# Patient Record
Sex: MALE | Race: WHITE | Employment: FULL TIME | URBAN - METROPOLITAN AREA
[De-identification: names, ages, dates, MRNs, and addresses within clinical notes are randomized per-mention and may not be internally consistent; named-entity substitution may affect disease eponyms.]

---

## 2023-02-14 ENCOUNTER — OFFICE VISIT (OUTPATIENT)
Dept: URGENT CARE | Age: 63
End: 2023-02-14
Payer: COMMERCIAL

## 2023-02-14 VITALS
TEMPERATURE: 98.4 F | DIASTOLIC BLOOD PRESSURE: 86 MMHG | OXYGEN SATURATION: 98 % | SYSTOLIC BLOOD PRESSURE: 148 MMHG | WEIGHT: 247.5 LBS | RESPIRATION RATE: 18 BRPM | HEART RATE: 107 BPM

## 2023-02-14 DIAGNOSIS — L30.9 DERMATITIS: ICD-10-CM

## 2023-02-14 DIAGNOSIS — R94.31 ABNORMAL ECG: ICD-10-CM

## 2023-02-14 DIAGNOSIS — R05.9 COUGH, UNSPECIFIED TYPE: Primary | ICD-10-CM

## 2023-02-14 LAB — SARS-COV-2 PCR, POC: NEGATIVE

## 2023-02-14 PROCEDURE — 87635 SARS-COV-2 COVID-19 AMP PRB: CPT | Performed by: NURSE PRACTITIONER

## 2023-02-14 PROCEDURE — 99204 OFFICE O/P NEW MOD 45 MIN: CPT | Performed by: NURSE PRACTITIONER

## 2023-02-14 PROCEDURE — 93000 ELECTROCARDIOGRAM COMPLETE: CPT | Performed by: NURSE PRACTITIONER

## 2023-02-14 RX ORDER — PREDNISONE 10 MG/1
10 TABLET ORAL SEE ADMIN INSTRUCTIONS
Qty: 21 TABLET | Refills: 0 | Status: SHIPPED | OUTPATIENT
Start: 2023-02-14

## 2023-02-14 RX ORDER — DOXYCYCLINE 100 MG/1
100 CAPSULE ORAL 2 TIMES DAILY
Qty: 20 CAPSULE | Refills: 0 | Status: SHIPPED | OUTPATIENT
Start: 2023-02-14 | End: 2023-02-24

## 2023-02-14 NOTE — PROGRESS NOTES
HPI   Pt presents with complaints cough and chest congestion which has moved down to his abdomen. Abdomen feels \"tight\". Has pruritic rash to legs, back, abdomen. Swelling to left foot and ankle. Denies fevers, CP, SOB, N/V/D. Has not had full CPE since [facility] release in 2009. Used to take BP and DM meds but stopped when he was released and has resumed healthy lifestyle with diet and remaining active. Nonsmoker. Drinks few beers every night. Denies illicit drug use. Works construction. Past Medical History:   Diagnosis Date    Hypertension         History reviewed. No pertinent surgical history. History reviewed. No pertinent family history. Social History     Socioeconomic History    Marital status: SINGLE     Spouse name: Not on file    Number of children: Not on file    Years of education: Not on file    Highest education level: Not on file   Occupational History    Not on file   Tobacco Use    Smoking status: Never    Smokeless tobacco: Never   Vaping Use    Vaping Use: Never used   Substance and Sexual Activity    Alcohol use: Yes     Alcohol/week: 6.0 standard drinks     Types: 6 Cans of beer per week     Comment: daily    Drug use: Never    Sexual activity: Yes   Other Topics Concern    Not on file   Social History Narrative    Not on file     Social Determinants of Health     Financial Resource Strain: Not on file   Food Insecurity: Not on file   Transportation Needs: Not on file   Physical Activity: Not on file   Stress: Not on file   Social Connections: Not on file   Intimate Partner Violence: Not on file   Housing Stability: Not on file                ALLERGIES: Patient has no known allergies. Review of Systems   Constitutional:  Negative for chills, fatigue and fever. HENT:  Negative for congestion and sore throat. Respiratory:  Positive for cough. Negative for shortness of breath and wheezing. Cardiovascular:  Positive for leg swelling. Negative for chest pain. Gastrointestinal:  Positive for abdominal distention. Negative for diarrhea, nausea and vomiting. Skin:  Positive for rash. Vitals:    02/14/23 1201 02/14/23 1208   BP: (!) 160/107 (!) 144/103   Pulse: (!) 107    Resp: 18    Temp: 98.4 °F (36.9 °C)    SpO2: 98%    Weight: 247 lb 8 oz (112.3 kg)        Physical Exam  Constitutional:       General: He is not in acute distress. Appearance: Normal appearance. He is not ill-appearing or toxic-appearing. HENT:      Head: Normocephalic and atraumatic. Right Ear: Tympanic membrane, ear canal and external ear normal.      Left Ear: Tympanic membrane, ear canal and external ear normal.      Nose: Nose normal.      Mouth/Throat:      Mouth: Mucous membranes are moist.      Pharynx: Oropharynx is clear. Eyes:      Extraocular Movements: Extraocular movements intact. Conjunctiva/sclera: Conjunctivae normal.      Pupils: Pupils are equal, round, and reactive to light. Cardiovascular:      Rate and Rhythm: Normal rate and regular rhythm. Heart sounds: No murmur heard. No friction rub. Comments: 1+ pitting edema L ankle and foot  Pulmonary:      Effort: Pulmonary effort is normal.      Breath sounds: Wheezing present. Abdominal:      General: Abdomen is flat. Bowel sounds are normal.      Palpations: Abdomen is soft. Musculoskeletal:      Cervical back: Normal range of motion and neck supple. Lymphadenopathy:      Cervical: No cervical adenopathy. Skin:     General: Skin is warm and dry. Findings: Rash present. Comments: Dry scaly, papular rash to lower legs and back. Neurological:      Mental Status: He is alert. XR Results (most recent):  Results from Appointment encounter on 02/14/23    XR CHEST PA LAT    Narrative  EXAM: XR CHEST PA LAT    INDICATION: Cough    COMPARISON: None    TECHNIQUE: PA and lateral chest views    FINDINGS: Heart size is prominent.  Bilateral pleural reaction with minimal  interstitial edema suggests early CHF. There is no focal consolidation. Impression  Possible early CHF. ICD-10-CM ICD-9-CM   1. Cough, unspecified type  R05.9 786.2   2. Abnormal ECG  R94.31 794.31   3. Dermatitis  L30.9 692.9       Orders Placed This Encounter    XR CHEST PA LAT     Standing Status:   Future     Number of Occurrences:   1     Standing Expiration Date:   3/14/2024     Order Specific Question:   Reason for Exam     Answer:   cough x 1mo    REFERRAL TO INTERNAL MEDICINE     Referral Priority:   Routine     Referral Type:   Consultation     Referral Reason:   Specialty Services Required     Referred to Provider:   Renee Lyons MD     Number of Visits Requested:   1    POCT COVID-19, SARS-COV-2, PCR     Order Specific Question:   Is this test for diagnosis or screening? Answer:   Diagnosis of ill patient     Order Specific Question:   Symptomatic for COVID-19 as defined by CDC? Answer:   Yes     Order Specific Question:   Date of Symptom Onset     Answer:   1/17/2023     Order Specific Question:   Hospitalized for COVID-19? Answer:   No     Order Specific Question:   Admitted to ICU for COVID-19? Answer:   No     Order Specific Question:   Employed in healthcare setting? Answer:   No     Order Specific Question:   Resident in a congregate (group) care setting? Answer:   No     Order Specific Question:   Previously tested for COVID-19? Answer:   Unknown    EKG, 12 LEAD, INITIAL     Standing Status:   Future     Number of Occurrences:   1     Standing Expiration Date:   8/14/2023     Order Specific Question:   Reason for Exam:     Answer:   possible CHF    doxycycline (MONODOX) 100 mg capsule     Sig: Take 1 Capsule by mouth two (2) times a day for 10 days. Dispense:  20 Capsule     Refill:  0    predniSONE (STERAPRED DS) 10 mg dose pack     Sig: Take 1 Tablet by mouth See Admin Instructions.  See administration instruction per 10mg dose pack     Dispense:  21 Tablet Refill:  0      Discussed with pt that he likely has early CHF which may be secondary to uncontrolled HTN. Needs to establish with PCP for routine health maintenance, labs, etc.  List given for PCP. Copy of EKG and CXR results given to pt. Pt was scheduled appt with Cardiology on 3/1 at 8:30, Dr. Esme Arreola. Pt given appt details by nurse. The patient is to follow up with PCP - list given and Cardiology. Recommend PCP Dr. Nora Skelton. Singh Burns. If signs and symptoms become worse the pt is to go to the ER. Marga Carroll NP    [facility]    EKG      Date/Time: 2/14/2023 2:23 PM  Performed by: Rowan Lundberg NP  Authorized by:  Rowan Lundberg NP   Comparison: not compared with previous ECG   Rhythm: sinus rhythm  Ectopy: PVCs and bigeminy  Rate: normal  Clinical impression: abnormal ECG

## 2023-03-08 LAB
CREATININE, EXTERNAL: 0.98
HBA1C MFR BLD HPLC: 8.1 %

## 2023-03-23 ENCOUNTER — HOSPITAL ENCOUNTER (OUTPATIENT)
Age: 63
Setting detail: OUTPATIENT SURGERY
Discharge: HOME | End: 2023-03-23
Attending: INTERNAL MEDICINE | Admitting: INTERNAL MEDICINE
Payer: COMMERCIAL

## 2023-03-23 VITALS
TEMPERATURE: 98.5 F | HEART RATE: 100 BPM | BODY MASS INDEX: 25.86 KG/M2 | HEIGHT: 77 IN | SYSTOLIC BLOOD PRESSURE: 131 MMHG | WEIGHT: 219 LBS | DIASTOLIC BLOOD PRESSURE: 90 MMHG | OXYGEN SATURATION: 97 % | RESPIRATION RATE: 15 BRPM

## 2023-03-23 DIAGNOSIS — R07.9 CHEST PAIN, UNSPECIFIED TYPE: ICD-10-CM

## 2023-03-23 LAB
GLUCOSE BLD STRIP.AUTO-MCNC: 162 MG/DL (ref 65–117)
SERVICE CMNT-IMP: ABNORMAL

## 2023-03-23 PROCEDURE — 2709999900 HC NON-CHARGEABLE SUPPLY: Performed by: INTERNAL MEDICINE

## 2023-03-23 PROCEDURE — 99153 MOD SED SAME PHYS/QHP EA: CPT | Performed by: INTERNAL MEDICINE

## 2023-03-23 PROCEDURE — 74011250636 HC RX REV CODE- 250/636: Performed by: INTERNAL MEDICINE

## 2023-03-23 PROCEDURE — C1769 GUIDE WIRE: HCPCS | Performed by: INTERNAL MEDICINE

## 2023-03-23 PROCEDURE — 74011000250 HC RX REV CODE- 250: Performed by: INTERNAL MEDICINE

## 2023-03-23 PROCEDURE — 77030008543 HC TBNG MON PRSS MRTM -A: Performed by: INTERNAL MEDICINE

## 2023-03-23 PROCEDURE — 77030015766: Performed by: INTERNAL MEDICINE

## 2023-03-23 PROCEDURE — 93460 R&L HRT ART/VENTRICLE ANGIO: CPT | Performed by: INTERNAL MEDICINE

## 2023-03-23 PROCEDURE — 77030010221 HC SPLNT WR POS TELE -B: Performed by: INTERNAL MEDICINE

## 2023-03-23 PROCEDURE — C1751 CATH, INF, PER/CENT/MIDLINE: HCPCS | Performed by: INTERNAL MEDICINE

## 2023-03-23 PROCEDURE — 74011250637 HC RX REV CODE- 250/637: Performed by: INTERNAL MEDICINE

## 2023-03-23 PROCEDURE — 77030022017 HC DRSG HEMO QCLOT ZMED -A: Performed by: INTERNAL MEDICINE

## 2023-03-23 PROCEDURE — 99152 MOD SED SAME PHYS/QHP 5/>YRS: CPT | Performed by: INTERNAL MEDICINE

## 2023-03-23 PROCEDURE — C1894 INTRO/SHEATH, NON-LASER: HCPCS | Performed by: INTERNAL MEDICINE

## 2023-03-23 PROCEDURE — 77030019569 HC BND COMPR RAD TERU -B: Performed by: INTERNAL MEDICINE

## 2023-03-23 PROCEDURE — 82962 GLUCOSE BLOOD TEST: CPT

## 2023-03-23 PROCEDURE — 74011000636 HC RX REV CODE- 636: Performed by: INTERNAL MEDICINE

## 2023-03-23 RX ORDER — METOPROLOL SUCCINATE 50 MG/1
TABLET, EXTENDED RELEASE ORAL DAILY
COMMUNITY

## 2023-03-23 RX ORDER — HEPARIN SODIUM 1000 [USP'U]/ML
INJECTION, SOLUTION INTRAVENOUS; SUBCUTANEOUS AS NEEDED
Status: DISCONTINUED | OUTPATIENT
Start: 2023-03-23 | End: 2023-03-23 | Stop reason: HOSPADM

## 2023-03-23 RX ORDER — FENTANYL CITRATE 50 UG/ML
INJECTION, SOLUTION INTRAMUSCULAR; INTRAVENOUS AS NEEDED
Status: DISCONTINUED | OUTPATIENT
Start: 2023-03-23 | End: 2023-03-23 | Stop reason: HOSPADM

## 2023-03-23 RX ORDER — SACUBITRIL AND VALSARTAN 24; 26 MG/1; MG/1
1 TABLET, FILM COATED ORAL 2 TIMES DAILY
COMMUNITY

## 2023-03-23 RX ORDER — METFORMIN HYDROCHLORIDE 500 MG/1
TABLET ORAL 2 TIMES DAILY WITH MEALS
COMMUNITY

## 2023-03-23 RX ORDER — VERAPAMIL HYDROCHLORIDE 2.5 MG/ML
INJECTION, SOLUTION INTRAVENOUS AS NEEDED
Status: DISCONTINUED | OUTPATIENT
Start: 2023-03-23 | End: 2023-03-23 | Stop reason: HOSPADM

## 2023-03-23 RX ORDER — GUAIFENESIN 100 MG/5ML
81 LIQUID (ML) ORAL
Status: COMPLETED | OUTPATIENT
Start: 2023-03-23 | End: 2023-03-23

## 2023-03-23 RX ORDER — BUMETANIDE 2 MG/1
2 TABLET ORAL DAILY
COMMUNITY

## 2023-03-23 RX ORDER — HEPARIN SODIUM 200 [USP'U]/100ML
INJECTION, SOLUTION INTRAVENOUS
Status: COMPLETED | OUTPATIENT
Start: 2023-03-23 | End: 2023-03-23

## 2023-03-23 RX ORDER — LIDOCAINE HYDROCHLORIDE 10 MG/ML
INJECTION, SOLUTION EPIDURAL; INFILTRATION; INTRACAUDAL; PERINEURAL AS NEEDED
Status: DISCONTINUED | OUTPATIENT
Start: 2023-03-23 | End: 2023-03-23 | Stop reason: HOSPADM

## 2023-03-23 RX ORDER — MIDAZOLAM HYDROCHLORIDE 1 MG/ML
INJECTION, SOLUTION INTRAMUSCULAR; INTRAVENOUS AS NEEDED
Status: DISCONTINUED | OUTPATIENT
Start: 2023-03-23 | End: 2023-03-23 | Stop reason: HOSPADM

## 2023-03-23 RX ADMIN — ASPIRIN 81 MG: 81 TABLET, CHEWABLE ORAL at 08:46

## 2023-03-23 NOTE — Clinical Note
Right radial artery. Accessed successfully. Radial access needle used. Using ultrasound guidance.  Number of attempts =  1. No

## 2023-03-23 NOTE — PROGRESS NOTES
Primary Nurse Stefani Turk RN and Chelsea Alvarenga RN performed a dual skin assessment on this patient No impairment noted  Aleks score is 23  Mepilex applied to sacrum

## 2023-03-23 NOTE — CARDIO/PULMONARY
Cardiopulmonary Rehab:    Chart reviewed. Pt is a 57 y.o. M admitted with Chest pain, unspecified type [R07.9]. No updated EF on chart, patient is a nonsmoker. S/p cardiac cath on 3/23/23. Per Dr. Ana María Luo note: \"Normal coronaries except for moderate stenosis mid PDA\" however, patient does not currently qualify for CP rehab relating to no diagnosis of angina being present.

## 2023-03-23 NOTE — Clinical Note
Sheath #2: Closed using manual compression and Hemostasis Pad. Site secured by Tegaderm. Pressure held for: 10 minutes.

## 2023-03-23 NOTE — PROGRESS NOTES
Cardiac Cath Lab Recovery Arrival Note:      Sarah Jackson arrived to Cardiac Cath Lab, Recovery Area. Staff introduced to patient. Patient identifiers verified with NAME and DATE OF BIRTH. Procedure verified with patient. Consent forms reviewed and signed by patient or authorized representative and verified. Allergies verified. Patient and family oriented to department. Patient and family informed of procedure and plan of care. Questions answered with review. Patient prepped for procedure, per orders from physician, prior to arrival.    Patient on cardiac monitor, non-invasive blood pressure, SPO2 monitor. On room air. Patient is A&Ox 4. Patient reports no complaints . Patient in stretcher, in low position, with side rails up, call bell within reach, patient instructed to call if assistance as needed. Patient prep in: [address], [address]. Patient family has pager # [phone]  Family in: Jonathan Rivera (friend) [phone].    Prep by: Yin Stoll

## 2023-03-23 NOTE — PROGRESS NOTES
TRANSFER - IN REPORT:    Verbal report received from [facility] on Archana Barreto  being received from [place] for routine progression of care. Report consisted of patients Situation, Background, Assessment and Recommendations(SBAR). Information from the following report(s) SBAR and Procedure Summary was reviewed with the receiving clinician. Opportunity for questions and clarification was provided. Assessment completed upon patients arrival to [address] [place] [address] and care [facility]. Cardiac Cath Lab Recovery Arrival Note:    Archana Barreto arrived to [facility] recovery area. Patient procedure= LHC. Patient on cardiac monitor, non-invasive blood pressure, SPO2 monitor. On RA. IV  of NS on pump at 50 ml/hr. Patient status doing well without problems. Patient is A&Ox 4. Patient reports no complaints. PROCEDURE SITE CHECK:    Procedure site:without any bleeding and no hematoma, No pain/discomfort reported at procedure site. No change in patient status. Continue to monitor patient and status.

## 2023-03-23 NOTE — PROGRESS NOTES
1330: I have reviewed discharge instructions with the patient. The patient verbalized understanding. 1340: Ambulated in the bathroom without difficulty. Patient denies any complaints.     1345: Patient is stable for discharge, waiting for a ride    1440: PIV removed, escorted patient to lobby

## 2023-03-28 ENCOUNTER — OFFICE VISIT (OUTPATIENT)
Dept: ENDOCRINOLOGY | Age: 63
End: 2023-03-28
Payer: COMMERCIAL

## 2023-03-28 VITALS
HEART RATE: 104 BPM | SYSTOLIC BLOOD PRESSURE: 120 MMHG | HEIGHT: 77 IN | BODY MASS INDEX: 26.05 KG/M2 | DIASTOLIC BLOOD PRESSURE: 75 MMHG | WEIGHT: 220.6 LBS

## 2023-03-28 DIAGNOSIS — I10 PRIMARY HYPERTENSION: ICD-10-CM

## 2023-03-28 DIAGNOSIS — E11.65 TYPE 2 DIABETES MELLITUS WITH HYPERGLYCEMIA, WITHOUT LONG-TERM CURRENT USE OF INSULIN (HCC): Primary | ICD-10-CM

## 2023-03-28 PROCEDURE — 99205 OFFICE O/P NEW HI 60 MIN: CPT | Performed by: INTERNAL MEDICINE

## 2023-03-28 PROCEDURE — 3078F DIAST BP <80 MM HG: CPT | Performed by: INTERNAL MEDICINE

## 2023-03-28 PROCEDURE — 3074F SYST BP LT 130 MM HG: CPT | Performed by: INTERNAL MEDICINE

## 2023-03-28 RX ORDER — METOLAZONE 2.5 MG/1
1 TABLET ORAL
COMMUNITY
Start: 2023-03-20

## 2023-03-28 RX ORDER — FUROSEMIDE 40 MG/1
40 TABLET ORAL DAILY
COMMUNITY
Start: 2023-03-01

## 2023-03-28 NOTE — LETTER
3/28/2023 10:50 AM    Patient:  Dahlia Bangura   YOB: 1960  Date of Visit: 3/28/2023      Dear Izabella Ferguson NP  [address]  [address] [address]  Via Fax: [phone]: Thank you for referring Mr. Ronal Saunders to me for evaluation/treatment. Below are the relevant portions of my assessment and plan of care. If you have questions, please do not hesitate to call me. I look forward to following Mr. Roberto De La Rosa along with you. Chief Complaint   Patient presents with    New Patient    Diabetes     PCP and Pharmacy verified     History of Present Illness: Dahlia Bangura is a 57 y.o. male who I was asked to see in consult by Florina Ferguson  for evaluation of diabetes. \"I had a bout with heart failure and when they were checking me out they said I was diabetic also and they recommend I see you. On Valentines 2023 he noted that \"I had gotten swollen up and they sen me to the hospital. I was up to 267 pounds, but they started me on fluid pills and my weight is now down to 220 pounds. Pt did have a heart cath on 3/23/23 and it showed no CAD, but he did have CHF. Jennifre Bereket said my EF was 10-15%. He is followed by Florina Ferguson NP of cardiology. He was started on Metformin 500mg BID, and Farxiga 10mg \"a couple of weeks ago\". His A1C on 3/23/23 was 8.1%, his TSH was 4.04. Prior to February 2023 he was not taking any medications. He is now on Lasix, Entresto, bumex and the DM meds mentioned above. No known family hx of DM. No known family hx of CAD or CHF. He has not been instructed to start testing his BGs. A typical day is as follows:  - Pt notes that he has been waking at [address] [address] [address] \"on my new schedule\". - He will have breakfast around 830-9AM, yesterday he had sausage, eggs, toast and coffee (black). - He has lunch around 2-3PM \"when I get hungry\".  Yesterday he had a hamburger steak, eggs, bread and OJ.  - He notes he does not typically have a dinner in the evening, but will snack at night. He has been snacking in trail mix and chips. 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 Pt notes he has been off work since February. He has not been put in any cardiac rehab. Last eye exam was \"I have not seen an eye doctor in a long time and I see good so I have not worried about it. \"    Past Medical History:   Diagnosis Date    Hypertension      No past surgical history on file. Current Outpatient Medications   Medication Sig    furosemide (LASIX) 40 mg tablet Take 40 mg by mouth daily.  metOLazone (ZAROXOLYN) 2.5 mg tablet Take 1 Tablet by mouth every seven (7) days.  metFORMIN (GLUCOPHAGE) 500 mg tablet Take  by mouth two (2) times daily (with meals).  bumetanide (BUMEX) 2 mg tablet Take 2 mg by mouth daily.  sacubitriL-valsartan (Entresto) 24-26 mg tablet Take 1 Tablet by mouth two (2) times a day.  dapagliflozin (FARXIGA) 10 mg tab tablet Take  by mouth daily.  metoprolol succinate (TOPROL-XL) 50 mg XL tablet Take  by mouth daily. No current facility-administered medications for this visit. No Known Allergies  No family history on file. Social History     Socioeconomic History    Marital status: SINGLE     Spouse name: Not on file    Number of children: Not on file    Years of education: Not on file    Highest education level: Not on file   Occupational History    Not on file   Tobacco Use    Smoking status: Never    Smokeless tobacco: Never   Vaping Use    Vaping Use: Never used   Substance and Sexual Activity    Alcohol use:  Yes     Alcohol/week: 6.0 standard drinks     Types: 6 Cans of beer per week     Comment: daily    Drug use: Never    Sexual activity: Yes   Other Topics Concern    Not on file   Social History Narrative    Not on file     Social Determinants of Health     Financial Resource Strain: Not on file   Food Insecurity: Not on file Transportation Needs: Not on file   Physical Activity: Not on file   Stress: Not on file   Social Connections: Not on file   Intimate Partner Violence: Not on file   Housing Stability: Not on file     Review of Systems:  - Constitutional Symptoms: no fevers, chills, weight loss  - Eyes: no blurry vision or double vision  - Cardiovascular: no chest pain or palpitations  - Respiratory: no cough or shortness of breath  - Gastrointestinal: no dysphagia or abdominal pain  - Musculoskeletal: chronic back pains  - Integumentary: no rashes  - Neurological: no numbness, tingling, or headaches  - Psychiatric: no depression or anxiety  - Endocrine: no heat or cold intolerance, no polyuria or polydipsia    Physical Examination:  - Blood pressure (!) 147/90, pulse (!) 104, height 6' 5\" (1.956 m), weight 220 lb 9.6 oz (100.1 kg).   - General: pleasant, no distress, good eye contact  - HEENT: no exopthalmos, no periorbital edema, no scleral/conjunctival injection, EOMI, no lid lag or stare  - Neck: supple, no thyromegaly, masses, lymph nodes, or carotid bruits, no supraclavicular or dorsocervical fat pads  - Cardiovascular: regular, normal rate, normal S1 and S2, no murmurs/rubs/gallops, 2+ dorsalis pedis pulses bilaterally  - Respiratory: clear to auscultation bilaterally  - Gastrointestinal: soft, nontender, nondistended, no masses, no hepatosplenomegaly  - Musculoskeletal: no proximal muscle weakness in upper or lower extremities  - Integumentary: no acanthosis nigricans, no rashes, TR LE edema, no foot ulcers  - Neurological: DTR 2+, no tremors  - Psychiatric: normal mood and affect    Diabetic foot exam:     Left Foot:   Visual Exam: callous - present   Pulse DP: 2+ (normal)   Filament test: normal sensation    Vibratory sensation: normal      Right Foot:   Visual Exam: callous - present   Pulse DP: 2+ (normal)   Filament test: normal sensation    Vibratory sensation: normal      Data Reviewed:   See HPI    Assessment/Plan: 1. Type 2 diabetes mellitus with hyperglycemia, without long-term current use of insulin ([facility] [address]    2. Primary hypertension      There are no Patient Instructions on file for this visit. Copy sent to:  Dariela Dempsey    Chief Complaint   Patient presents with    New Patient    Diabetes     PCP and Pharmacy verified     History of Present Illness: Crissy Gutierrez is a 57 y.o. male who I was asked to see in consult by Dariela Dempsey  for evaluation of diabetes. \"I had a bout with heart failure and when they were checking me out they said I was diabetic also and they recommend I see you. On Valentines 2023 he noted that \"I had gotten swollen up and they sen me to the hospital. I was up to 267 pounds, but they started me on fluid pills and my weight is now down to 220 pounds. Pt did have a heart cath on 3/23/23 and it showed no CAD, but he did have CHF. Aaron Brook said my EF was 10-15%. He is followed by Dariela Dempsey NP of cardiology. He was started on Metformin 500mg BID, and Farxiga 10mg \"a couple of weeks ago\". His A1C on 3/23/23 was 8.1%, his TSH was 4.04. Prior to February 2023 he was not taking any medications. He is now on Lasix, Entresto, bumex and the DM meds mentioned above. No known family hx of DM. No known family hx of CAD or CHF. He has not been instructed to start testing his BGs. A typical day is as follows:  - Pt notes that he has been waking at [address] [address] [address] \"on my new schedule\". - He will have breakfast around 830-9AM, yesterday he had sausage, eggs, toast and coffee (black). - He has lunch around 2-3PM \"when I get hungry\". Yesterday he had a hamburger steak, eggs, bread and OJ.  - He notes he does not typically have a dinner in the evening, but will snack at night. He has been snacking in trail mix and chips. 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 Pt notes he has been off work since February.  He has not been put in any cardiac rehab. Last eye exam was \"I have not seen an eye doctor in a long time and I see good so I have not worried about it. \"    Past Medical History:   Diagnosis Date    Hypertension      History reviewed. No pertinent surgical history. Current Outpatient Medications   Medication Sig    furosemide (LASIX) 40 mg tablet Take 40 mg by mouth daily.  metOLazone (ZAROXOLYN) 2.5 mg tablet Take 1 Tablet by mouth every seven (7) days.  metFORMIN (GLUCOPHAGE) 500 mg tablet Take  by mouth two (2) times daily (with meals).  bumetanide (BUMEX) 2 mg tablet Take 2 mg by mouth daily.  sacubitriL-valsartan (Entresto) 24-26 mg tablet Take 1 Tablet by mouth two (2) times a day.  dapagliflozin (FARXIGA) 10 mg tab tablet Take  by mouth daily.  metoprolol succinate (TOPROL-XL) 50 mg XL tablet Take  by mouth daily. No current facility-administered medications for this visit. No Known Allergies  Family History   Problem Relation Age of Onset    No Known Problems Mother         \"I don't know what issues she had\"    Asthma Father     No Known Problems Maternal Grandmother     No Known Problems Maternal Grandfather     No Known Problems Paternal Grandmother     Asthma Paternal Grandfather      Social History     Socioeconomic History    Marital status: SINGLE     Spouse name: Not on file    Number of children: Not on file    Years of education: Not on file    Highest education level: Not on file   Occupational History    Not on file   Tobacco Use    Smoking status: Never    Smokeless tobacco: Never   Vaping Use    Vaping Use: Never used   Substance and Sexual Activity    Alcohol use: Yes     Alcohol/week: 6.0 standard drinks     Types: 6 Cans of beer per week     Comment: \"every couple of days\".     Drug use: Not Currently     Comment: \"That is all in my past\"    Sexual activity: Yes   Other Topics Concern    Not on file   Social History Narrative    Not on file     Social Determinants of Health     Financial Resource Strain: Not on file   Food Insecurity: Not on file   Transportation Needs: Not on file   Physical Activity: Not on file   Stress: Not on file   Social Connections: Not on file   Intimate Partner Violence: Not on file   Housing Stability: Not on file     Review of Systems:  - Constitutional Symptoms: no fevers, chills, weight loss  - Eyes: no blurry vision or double vision  - Cardiovascular: no chest pain or palpitations  - Respiratory: no cough or shortness of breath  - Gastrointestinal: no dysphagia or abdominal pain  - Musculoskeletal: chronic back pains  - Integumentary: no rashes  - Neurological: no numbness, tingling, or headaches  - Psychiatric: no depression or anxiety  - Endocrine: no heat or cold intolerance, no polyuria or polydipsia    Physical Examination:  - Blood pressure 120/75, pulse (!) 104, height 6' 5\" (1.956 m), weight 220 lb 9.6 oz (100.1 kg).   - General: pleasant, no distress, good eye contact  - HEENT: no exopthalmos, no periorbital edema, no scleral/conjunctival injection, EOMI, no lid lag or stare  - Neck: supple, no thyromegaly, masses, lymph nodes, or carotid bruits, no supraclavicular or dorsocervical fat pads  - Cardiovascular: regular, normal rate, normal S1 and S2, no murmurs/rubs/gallops, 2+ dorsalis pedis pulses bilaterally  - Respiratory: clear to auscultation bilaterally  - Gastrointestinal: soft, nontender, nondistended, no masses, no hepatosplenomegaly  - Musculoskeletal: no proximal muscle weakness in upper or lower extremities  - Integumentary: no acanthosis nigricans, no rashes, TR LE edema, no foot ulcers  - Neurological: DTR 2+, no tremors  - Psychiatric: normal mood and affect    Diabetic foot exam:     Left Foot:   Visual Exam: callous - present   Pulse DP: 2+ (normal)   Filament test: normal sensation    Vibratory sensation: normal      Right Foot:   Visual Exam: callous - present   Pulse DP: 2+ (normal)   Filament test: normal sensation    Vibratory sensation: normal      Data Reviewed:   See HPI    Assessment/Plan:   1. Type 2 diabetes mellitus with hyperglycemia, without long-term current use of insulin ([address]    2. Primary hypertension    1) Pt given copy of \"Daily Diabetes Meal Planning Guide\" and educated about the \"[place] dinner plate\", reading food labels and which foods have the highest carbohydrates. Pt instructed to limit her carbohydrates to 45-60 grams with meals and 15 grams or less with snacks (but to limit snacks). Will refer [facility] to [facility]. I agree with his cardiologist's recommendation for Metformin 500mg BID and Farxiga 10mg daily. Pt is refusing to test his BGs \"I feel fine and I don't need to test at this time. \"    2) His BP today was 120/75. PT is on Entresto and Metoprolol. I will not make any changes to his HTN regimen. Pt voices understanding and agreement with the plan. RTC 4 months    Patient Instructions   1) Continue the Metformin one pill every morning and one pill every evening. 2) Continue the Farxiga 10mg, one pill every day.       Copy sent to:  Niranjan Osman      Sincerely,      Mina Johnston MD

## 2023-03-28 NOTE — PATIENT INSTRUCTIONS
1) Continue the Metformin one pill every morning and one pill every evening. 2) Continue the Farxiga 10mg, one pill every day.

## 2023-03-28 NOTE — PROGRESS NOTES
Chief Complaint   Patient presents with    New Patient    Diabetes     PCP and Pharmacy verified     History of Present Illness: Ginger Viramontes is a 57 y.o. male who I was asked to see in consult by Cailin Edwards  for evaluation of diabetes. \"I had a bout with heart failure and when they were checking me out they said I was diabetic also and they recommend I see you. On Valentines 2023 he noted that \"I had gotten swollen up and they sen me to the hospital. I was up to 267 pounds, but they started me on fluid pills and my weight is now down to 220 pounds. Pt did have a heart cath on 3/23/23 and it showed no CAD, but he did have CHF. [place] said my EF was 10-15%. He is followed by Cailin Edwards NP of cardiology. He was started on Metformin 500mg BID, and Farxiga 10mg \"a couple of weeks ago\". His A1C on 3/23/23 was 8.1%, his TSH was 4.04. Prior to February 2023 he was not taking any medications. He is now on Lasix, Entresto, bumex and the DM meds mentioned above. No known family hx of DM. No known family hx of CAD or CHF. He has not been instructed to start testing his BGs. A typical day is as follows:  - Pt notes that he has been waking at [address] [address] [address] \"on my new schedule\". - He will have breakfast around 830-9AM, yesterday he had sausage, eggs, toast and coffee (black). - He has lunch around 2-3PM \"when I get hungry\". Yesterday he had a hamburger steak, eggs, bread and OJ.  - He notes he does not typically have a dinner in the evening, but will snack at night. He has been snacking in trail mix and chips. 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 Pt notes he has been off work since February. He has not been put in any cardiac rehab. Last eye exam was \"I have not seen an eye doctor in a long time and I see good so I have not worried about it. \"    Past Medical History:   Diagnosis Date    Hypertension      History reviewed.  No pertinent surgical history. Current Outpatient Medications   Medication Sig    furosemide (LASIX) 40 mg tablet Take 40 mg by mouth daily. metOLazone (ZAROXOLYN) 2.5 mg tablet Take 1 Tablet by mouth every seven (7) days. metFORMIN (GLUCOPHAGE) 500 mg tablet Take  by mouth two (2) times daily (with meals). bumetanide (BUMEX) 2 mg tablet Take 2 mg by mouth daily. sacubitriL-valsartan (Entresto) 24-26 mg tablet Take 1 Tablet by mouth two (2) times a day. dapagliflozin (FARXIGA) 10 mg tab tablet Take  by mouth daily. metoprolol succinate (TOPROL-XL) 50 mg XL tablet Take  by mouth daily. No current facility-administered medications for this visit. No Known Allergies  Family History   Problem Relation Age of Onset    No Known Problems Mother         \"I don't know what issues she had\"    Asthma Father     No Known Problems Maternal Grandmother     No Known Problems Maternal Grandfather     No Known Problems Paternal Grandmother     Asthma Paternal Grandfather      Social History     Socioeconomic History    Marital status: SINGLE     Spouse name: Not on file    Number of children: Not on file    Years of education: Not on file    Highest education level: Not on file   Occupational History    Not on file   Tobacco Use    Smoking status: Never    Smokeless tobacco: Never   Vaping Use    Vaping Use: Never used   Substance and Sexual Activity    Alcohol use: Yes     Alcohol/week: 6.0 standard drinks     Types: 6 Cans of beer per week     Comment: \"every couple of days\".     Drug use: Not Currently     Comment: \"That is all in my past\"    Sexual activity: Yes   Other Topics Concern    Not on file   Social History Narrative    Not on file     Social Determinants of Health     Financial Resource Strain: Not on file   Food Insecurity: Not on file   Transportation Needs: Not on file   Physical Activity: Not on file   Stress: Not on file   Social Connections: Not on file   Intimate Partner Violence: Not on file   Housing Stability: Not on file     Review of Systems:  Constitutional Symptoms: no fevers, chills, weight loss  Eyes: no blurry vision or double vision  Cardiovascular: no chest pain or palpitations  Respiratory: no cough or shortness of breath  Gastrointestinal: no dysphagia or abdominal pain  Musculoskeletal: chronic back pains  Integumentary: no rashes  Neurological: no numbness, tingling, or headaches  Psychiatric: no depression or anxiety  Endocrine: no heat or cold intolerance, no polyuria or polydipsia    Physical Examination:  Blood pressure 120/75, pulse (!) 104, height 6' 5\" (1.956 m), weight 220 lb 9.6 oz (100.1 kg). General: pleasant, no distress, good eye contact  HEENT: no exopthalmos, no periorbital edema, no scleral/conjunctival injection, EOMI, no lid lag or stare  Neck: supple, no thyromegaly, masses, lymph nodes, or carotid bruits, no supraclavicular or dorsocervical fat pads  Cardiovascular: regular, normal rate, normal S1 and S2, no murmurs/rubs/gallops, 2+ dorsalis pedis pulses bilaterally  Respiratory: clear to auscultation bilaterally  Gastrointestinal: soft, nontender, nondistended, no masses, no hepatosplenomegaly  Musculoskeletal: no proximal muscle weakness in upper or lower extremities  Integumentary: no acanthosis nigricans, no rashes, TR LE edema, no foot ulcers  Neurological: DTR 2+, no tremors  Psychiatric: normal mood and affect    Diabetic foot exam:     Left Foot:   Visual Exam: callous - present   Pulse DP: 2+ (normal)   Filament test: normal sensation    Vibratory sensation: normal      Right Foot:   Visual Exam: callous - present   Pulse DP: 2+ (normal)   Filament test: normal sensation    Vibratory sensation: normal      Data Reviewed:   See HPI    Assessment/Plan:   1. Type 2 diabetes mellitus with hyperglycemia, without long-term current use of insulin ([address]    2.  Primary hypertension    1) Pt given copy of \"Daily Diabetes Meal Planning Guide\" and educated about the \"[place] dinner plate\", reading food labels and which foods have the highest carbohydrates. Pt instructed to limit her carbohydrates to 45-60 grams with meals and 15 grams or less with snacks (but to limit snacks). Will refer pt to the [facility] for [facility]. I agree with his cardiologist's recommendation for Metformin 500mg BID and Farxiga 10mg daily. Pt is refusing to test his BGs \"I feel fine and I don't need to test at this time. \"    2) His BP today was 120/75. PT is on Entresto and Metoprolol. I will not make any changes to his HTN regimen. Pt voices understanding and agreement with the plan. RTC 4 months    Patient Instructions   1) Continue the Metformin one pill every morning and one pill every evening. 2) Continue the Farxiga 10mg, one pill every day. Follow-up and Dispositions    Return in about 4 months (around 7/28/2023).          Copy sent to:  Florina Ferguson

## (undated) DEVICE — BALLOON WEDGE CATHETER 6 FR 110 CM --

## (undated) DEVICE — 3M™ TEGADERM™ TRANSPARENT FILM DRESSING FRAME STYLE, 1626W, 4 IN X 4-3/4 IN (10 CM X 12 CM), 50/CT 4CT/CASE: Brand: 3M™ TEGADERM™

## (undated) DEVICE — PACK PROCEDURE SURG HRT CATH

## (undated) DEVICE — RADIFOCUS OPTITORQUE ANGIOGRAPHIC CATHETER: Brand: OPTITORQUE

## (undated) DEVICE — HI-TORQUE VERSACORE FLOPPY GUIDE WIRE SYSTEM 145 CM: Brand: HI-TORQUE VERSACORE

## (undated) DEVICE — TR BAND RADIAL ARTERY COMPRESSION DEVICE: Brand: TR BAND

## (undated) DEVICE — GLIDESHEATH SLENDER ACCESS KIT: Brand: GLIDESHEATH SLENDER

## (undated) DEVICE — TUBING PRESS MON M/FEM 6IN --

## (undated) DEVICE — GUIDEWIRE VASC L260CM 0.035IN J TIP L3MM PTFE FIX COR NAMIC

## (undated) DEVICE — SPLINT WR POS F/ARTERIAL ACC -- BX/10

## (undated) DEVICE — HI-TORQUE SPARTACORE 14 PERIPHERAL GUIDE WIRE .014 5.0 CM X 190 CM: Brand: SPARTACORE

## (undated) DEVICE — Device

## (undated) DEVICE — PROVE COVER: Brand: UNBRANDED